# Patient Record
Sex: FEMALE | Race: WHITE | Employment: FULL TIME | URBAN - METROPOLITAN AREA
[De-identification: names, ages, dates, MRNs, and addresses within clinical notes are randomized per-mention and may not be internally consistent; named-entity substitution may affect disease eponyms.]

---

## 2018-11-22 ENCOUNTER — APPOINTMENT (OUTPATIENT)
Dept: GENERAL RADIOLOGY | Age: 48
End: 2018-11-22
Payer: COMMERCIAL

## 2018-11-22 ENCOUNTER — HOSPITAL ENCOUNTER (EMERGENCY)
Age: 48
Discharge: HOME | End: 2018-11-23
Attending: EMERGENCY MEDICINE
Payer: COMMERCIAL

## 2018-11-22 VITALS
DIASTOLIC BLOOD PRESSURE: 91 MMHG | HEART RATE: 69 BPM | RESPIRATION RATE: 18 BRPM | WEIGHT: 210 LBS | SYSTOLIC BLOOD PRESSURE: 132 MMHG | OXYGEN SATURATION: 99 % | BODY MASS INDEX: 34.99 KG/M2 | TEMPERATURE: 98.6 F | HEIGHT: 65 IN

## 2018-11-22 DIAGNOSIS — S52.591A OTHER CLOSED FRACTURE OF DISTAL END OF RIGHT RADIUS, INITIAL ENCOUNTER: Primary | ICD-10-CM

## 2018-11-22 DIAGNOSIS — S52.551A OTHER CLOSED EXTRA-ARTICULAR FRACTURE OF DISTAL END OF RIGHT RADIUS, INITIAL ENCOUNTER: ICD-10-CM

## 2018-11-22 DIAGNOSIS — M25.571 ACUTE RIGHT ANKLE PAIN: ICD-10-CM

## 2018-11-22 PROCEDURE — 73610 X-RAY EXAM OF ANKLE: CPT

## 2018-11-22 PROCEDURE — 73110 X-RAY EXAM OF WRIST: CPT

## 2018-11-22 PROCEDURE — 99283 EMERGENCY DEPT VISIT LOW MDM: CPT

## 2018-11-22 PROCEDURE — 4500000023 HC ED LEVEL 3 PROCEDURE

## 2018-11-22 ASSESSMENT — PAIN DESCRIPTION - ORIENTATION
ORIENTATION: RIGHT
ORIENTATION_2: RIGHT

## 2018-11-22 ASSESSMENT — ENCOUNTER SYMPTOMS
ALLERGIC/IMMUNOLOGIC NEGATIVE: 1
EYES NEGATIVE: 1
GASTROINTESTINAL NEGATIVE: 1
RESPIRATORY NEGATIVE: 1

## 2018-11-22 ASSESSMENT — PAIN DESCRIPTION - LOCATION
LOCATION: ANKLE
LOCATION_2: WRIST

## 2018-11-22 ASSESSMENT — PAIN DESCRIPTION - INTENSITY: RATING_2: 6

## 2018-11-22 ASSESSMENT — PAIN SCALES - GENERAL: PAINLEVEL_OUTOF10: 2

## 2018-11-23 NOTE — ED PROVIDER NOTES
ED Attending Attestation Note     Date of evaluation: 11/22/2018    This patient was seen by the resident. I have seen and examined the patient, agree with the workup, evaluation, management and diagnosis. The care plan has been discussed. My assessment reveals patient with ankle and wrist pain after fall. Found to have distal radius fracture. Splint and ortho/hand followup.      Elisa Alvarez MD  11/23/18 [id number]

## 2018-11-26 ENCOUNTER — OFFICE VISIT (OUTPATIENT)
Dept: ORTHOPEDIC SURGERY | Age: 48
End: 2018-11-26
Payer: COMMERCIAL

## 2018-11-26 VITALS
HEIGHT: 65 IN | SYSTOLIC BLOOD PRESSURE: 151 MMHG | HEART RATE: 65 BPM | BODY MASS INDEX: 34.99 KG/M2 | WEIGHT: 210 LBS | DIASTOLIC BLOOD PRESSURE: 82 MMHG

## 2018-11-26 DIAGNOSIS — S52.501A CLOSED FRACTURE OF DISTAL END OF RIGHT RADIUS, UNSPECIFIED FRACTURE MORPHOLOGY, INITIAL ENCOUNTER: Primary | ICD-10-CM

## 2018-11-26 DIAGNOSIS — M25.531 RIGHT WRIST PAIN: ICD-10-CM

## 2018-11-26 PROCEDURE — G8427 DOCREV CUR MEDS BY ELIG CLIN: HCPCS | Performed by: ORTHOPAEDIC SURGERY

## 2018-11-26 PROCEDURE — 3017F COLORECTAL CA SCREEN DOC REV: CPT | Performed by: ORTHOPAEDIC SURGERY

## 2018-11-26 PROCEDURE — 29075 APPL CST ELBW FNGR SHORT ARM: CPT | Performed by: ORTHOPAEDIC SURGERY

## 2018-11-26 PROCEDURE — G8484 FLU IMMUNIZE NO ADMIN: HCPCS | Performed by: ORTHOPAEDIC SURGERY

## 2018-11-26 PROCEDURE — G8419 CALC BMI OUT NRM PARAM NOF/U: HCPCS | Performed by: ORTHOPAEDIC SURGERY

## 2018-11-26 PROCEDURE — 99203 OFFICE O/P NEW LOW 30 MIN: CPT | Performed by: ORTHOPAEDIC SURGERY

## 2018-11-26 PROCEDURE — 1036F TOBACCO NON-USER: CPT | Performed by: ORTHOPAEDIC SURGERY

## 2018-11-26 RX ORDER — IBUPROFEN 200 MG
200 TABLET ORAL EVERY 6 HOURS PRN
COMMUNITY

## 2018-11-26 NOTE — PROGRESS NOTES
Chief Complaint    No chief complaint on file. History of Present Illness:  Diane Crook is a 46 y.o. female, left-hand dominant, works as a  presenting with history of right wrist injury  Date of injury: 11/22/2018  Mechanism of injury: The patient fell onto her right hand and wrist while walking dog attempting to jump over a puddle  She was subsequently seen at [facility] emergency department where images were obtained of her wrist demonstrating a distal radius fracture with minimal displacement and comminution  She was placed into a wrist splint and is here today for 1st clinical evaluation  She does report fracture of her right distal radius approximately 27 years ago treated nonoperatively  Pain has been overall improving, she does report some tingling in all of her fingers but no discrete numbness         Medical History:  Patient's medications, allergies, past medical, surgical, social and family histories were reviewed and updated as appropriate. Review of Systems:  Pertinent items are noted in HPI  Review of systems reviewed from Patient History Form dated on 11/26/18 and available in the patient's chart under the Media tab. Vital Signs: There were no vitals taken for this visit. General Exam:   Constitutional: Patient is adequately groomed with no evidence of malnutrition  DTRs: Deep tendon reflexes are intact  Mental Status: The patient is oriented to time, place and person. The patient's mood and affect are appropriate. Lymphatic: The lymphatic examination bilaterally reveals all areas to be without enlargement or induration. Vascular: Examination reveals no swelling or calf tenderness. Peripheral pulses are palpable and 2+. Neurological: The patient has good coordination. There is no weakness or sensory deficit.     Right Wrist/right upper extremity Examination:    Inspection:  Splint removed, there is diffuse ecchymosis and swelling of the right wrist without

## 2018-12-03 ENCOUNTER — OFFICE VISIT (OUTPATIENT)
Dept: ORTHOPEDIC SURGERY | Age: 48
End: 2018-12-03
Payer: COMMERCIAL

## 2018-12-03 DIAGNOSIS — S52.501A CLOSED FRACTURE OF DISTAL END OF RIGHT RADIUS, UNSPECIFIED FRACTURE MORPHOLOGY, INITIAL ENCOUNTER: Primary | ICD-10-CM

## 2018-12-03 PROCEDURE — 99213 OFFICE O/P EST LOW 20 MIN: CPT | Performed by: ORTHOPAEDIC SURGERY

## 2018-12-03 PROCEDURE — 1036F TOBACCO NON-USER: CPT | Performed by: ORTHOPAEDIC SURGERY

## 2018-12-03 PROCEDURE — G8484 FLU IMMUNIZE NO ADMIN: HCPCS | Performed by: ORTHOPAEDIC SURGERY

## 2018-12-03 PROCEDURE — G8417 CALC BMI ABV UP PARAM F/U: HCPCS | Performed by: ORTHOPAEDIC SURGERY

## 2018-12-03 PROCEDURE — G8427 DOCREV CUR MEDS BY ELIG CLIN: HCPCS | Performed by: ORTHOPAEDIC SURGERY

## 2018-12-03 PROCEDURE — 3017F COLORECTAL CA SCREEN DOC REV: CPT | Performed by: ORTHOPAEDIC SURGERY

## 2018-12-10 ENCOUNTER — OFFICE VISIT (OUTPATIENT)
Dept: ORTHOPEDIC SURGERY | Age: 48
End: 2018-12-10
Payer: COMMERCIAL

## 2018-12-10 DIAGNOSIS — S52.501A CLOSED FRACTURE OF DISTAL END OF RIGHT RADIUS, UNSPECIFIED FRACTURE MORPHOLOGY, INITIAL ENCOUNTER: Primary | ICD-10-CM

## 2018-12-10 PROCEDURE — G8417 CALC BMI ABV UP PARAM F/U: HCPCS | Performed by: ORTHOPAEDIC SURGERY

## 2018-12-10 PROCEDURE — 99213 OFFICE O/P EST LOW 20 MIN: CPT | Performed by: ORTHOPAEDIC SURGERY

## 2018-12-10 PROCEDURE — 3017F COLORECTAL CA SCREEN DOC REV: CPT | Performed by: ORTHOPAEDIC SURGERY

## 2018-12-10 PROCEDURE — G8427 DOCREV CUR MEDS BY ELIG CLIN: HCPCS | Performed by: ORTHOPAEDIC SURGERY

## 2018-12-10 PROCEDURE — 1036F TOBACCO NON-USER: CPT | Performed by: ORTHOPAEDIC SURGERY

## 2018-12-10 PROCEDURE — G8484 FLU IMMUNIZE NO ADMIN: HCPCS | Performed by: ORTHOPAEDIC SURGERY

## 2018-12-10 NOTE — PROGRESS NOTES
Assessment: 49-year-old female presenting with history of fall onto right wrist  1. Right distal radius fracture, minimally displaced        Treatment Plan: I had a long and detailed discussion with the patient today regarding her new images with evidence of further displacement in settling of her fracture secondary to the dorsal comminution. Options were discussed in detail including operative and nonoperative intervention with the risks and benefits of each treatment. Risks of nonoperative treatment specifically include further change in alignment and eventual malunion with stiffness or pain resulting. However if she does have continued healing and her fracture remains in the place that it is now than I do think that nonoperative treatment could result in a good overall function. I also did offer her the option for surgical stabilization today and correction of the malalignment. The patient prefers nonoperative treatment and would like to proceed with cast. We will plan at this time to that reevaluate in approximately 3 weeks with repeat imaging. She does understand that if she does have symptomatic malunion in the future that osteotomy and correction could be also considered. We will plan to remove cast at next visit and repeat imaging, pending clinical and radiographic exam will likely begin some gentle wrist range of motion and removable brace at that time           No Follow-up on file. History of Present Illness  Lita Lopez is a 46 y.o. female here today for a follow-up for Her right wrist injury consistent with comminuted distal radius fracture. We have been treating her conservatively with casting and immobilization. Date of injury: 11/22/2018  She is now nearly 3 weeks since her injury.  She is here today for repeat imaging and evaluation  Since her last visit her cast is been feeling appropriate, no evidence of new pain or swelling, no numbness or tingling or other symptoms reported

## 2018-12-14 ENCOUNTER — TELEPHONE (OUTPATIENT)
Dept: ORTHOPEDIC SURGERY | Age: 48
End: 2018-12-14

## 2019-01-03 ENCOUNTER — OFFICE VISIT (OUTPATIENT)
Dept: ORTHOPEDIC SURGERY | Age: 49
End: 2019-01-03
Payer: COMMERCIAL

## 2019-01-03 DIAGNOSIS — S52.501A CLOSED FRACTURE OF DISTAL END OF RIGHT RADIUS, UNSPECIFIED FRACTURE MORPHOLOGY, INITIAL ENCOUNTER: Primary | ICD-10-CM

## 2019-01-03 PROCEDURE — G8417 CALC BMI ABV UP PARAM F/U: HCPCS | Performed by: ORTHOPAEDIC SURGERY

## 2019-01-03 PROCEDURE — G8427 DOCREV CUR MEDS BY ELIG CLIN: HCPCS | Performed by: ORTHOPAEDIC SURGERY

## 2019-01-03 PROCEDURE — 3017F COLORECTAL CA SCREEN DOC REV: CPT | Performed by: ORTHOPAEDIC SURGERY

## 2019-01-03 PROCEDURE — L3908 WHO COCK-UP NONMOLDE PRE OTS: HCPCS | Performed by: ORTHOPAEDIC SURGERY

## 2019-01-03 PROCEDURE — G8484 FLU IMMUNIZE NO ADMIN: HCPCS | Performed by: ORTHOPAEDIC SURGERY

## 2019-01-03 PROCEDURE — 99213 OFFICE O/P EST LOW 20 MIN: CPT | Performed by: ORTHOPAEDIC SURGERY

## 2019-01-03 PROCEDURE — 1036F TOBACCO NON-USER: CPT | Performed by: ORTHOPAEDIC SURGERY

## 2019-01-07 ENCOUNTER — HOSPITAL ENCOUNTER (OUTPATIENT)
Dept: OCCUPATIONAL THERAPY | Age: 49
Setting detail: THERAPIES SERIES
Discharge: HOME | End: 2019-01-07
Payer: COMMERCIAL

## 2019-01-07 PROCEDURE — G8985 CARRY GOAL STATUS: HCPCS | Performed by: OCCUPATIONAL THERAPIST

## 2019-01-07 PROCEDURE — 97022 WHIRLPOOL THERAPY: CPT | Performed by: OCCUPATIONAL THERAPIST

## 2019-01-07 PROCEDURE — 97165 OT EVAL LOW COMPLEX 30 MIN: CPT | Performed by: OCCUPATIONAL THERAPIST

## 2019-01-07 PROCEDURE — G8984 CARRY CURRENT STATUS: HCPCS | Performed by: OCCUPATIONAL THERAPIST

## 2019-01-07 PROCEDURE — 97110 THERAPEUTIC EXERCISES: CPT | Performed by: OCCUPATIONAL THERAPIST

## 2019-01-14 ENCOUNTER — HOSPITAL ENCOUNTER (OUTPATIENT)
Dept: OCCUPATIONAL THERAPY | Age: 49
Setting detail: THERAPIES SERIES
Discharge: HOME | End: 2019-01-14
Payer: COMMERCIAL

## 2019-01-14 PROCEDURE — 97022 WHIRLPOOL THERAPY: CPT | Performed by: OCCUPATIONAL THERAPIST

## 2019-01-14 PROCEDURE — 97530 THERAPEUTIC ACTIVITIES: CPT | Performed by: OCCUPATIONAL THERAPIST

## 2019-01-14 PROCEDURE — 97110 THERAPEUTIC EXERCISES: CPT | Performed by: OCCUPATIONAL THERAPIST

## 2019-01-21 ENCOUNTER — HOSPITAL ENCOUNTER (OUTPATIENT)
Dept: OCCUPATIONAL THERAPY | Age: 49
Setting detail: THERAPIES SERIES
Discharge: HOME | End: 2019-01-21
Payer: COMMERCIAL

## 2019-01-21 PROCEDURE — 97110 THERAPEUTIC EXERCISES: CPT | Performed by: OCCUPATIONAL THERAPIST

## 2019-01-21 PROCEDURE — 97022 WHIRLPOOL THERAPY: CPT | Performed by: OCCUPATIONAL THERAPIST

## 2019-01-21 PROCEDURE — 97530 THERAPEUTIC ACTIVITIES: CPT | Performed by: OCCUPATIONAL THERAPIST

## 2019-01-31 ENCOUNTER — HOSPITAL ENCOUNTER (OUTPATIENT)
Dept: OCCUPATIONAL THERAPY | Age: 49
Setting detail: THERAPIES SERIES
Discharge: HOME | End: 2019-01-31
Payer: COMMERCIAL

## 2019-01-31 ENCOUNTER — OFFICE VISIT (OUTPATIENT)
Dept: ORTHOPEDIC SURGERY | Age: 49
End: 2019-01-31
Payer: COMMERCIAL

## 2019-01-31 DIAGNOSIS — S52.501A CLOSED FRACTURE OF DISTAL END OF RIGHT RADIUS, UNSPECIFIED FRACTURE MORPHOLOGY, INITIAL ENCOUNTER: Primary | ICD-10-CM

## 2019-01-31 PROCEDURE — G8985 CARRY GOAL STATUS: HCPCS | Performed by: OCCUPATIONAL THERAPIST

## 2019-01-31 PROCEDURE — G8984 CARRY CURRENT STATUS: HCPCS | Performed by: OCCUPATIONAL THERAPIST

## 2019-01-31 PROCEDURE — 97110 THERAPEUTIC EXERCISES: CPT | Performed by: OCCUPATIONAL THERAPIST

## 2019-01-31 PROCEDURE — 99213 OFFICE O/P EST LOW 20 MIN: CPT | Performed by: ORTHOPAEDIC SURGERY

## 2019-01-31 PROCEDURE — 97022 WHIRLPOOL THERAPY: CPT | Performed by: OCCUPATIONAL THERAPIST

## 2019-01-31 PROCEDURE — G8986 CARRY D/C STATUS: HCPCS | Performed by: OCCUPATIONAL THERAPIST

## 2019-03-14 ENCOUNTER — OFFICE VISIT (OUTPATIENT)
Dept: ORTHOPEDIC SURGERY | Age: 49
End: 2019-03-14
Payer: COMMERCIAL

## 2019-03-14 DIAGNOSIS — S52.501A CLOSED FRACTURE OF DISTAL END OF RIGHT RADIUS, UNSPECIFIED FRACTURE MORPHOLOGY, INITIAL ENCOUNTER: Primary | ICD-10-CM

## 2019-03-14 PROCEDURE — 99213 OFFICE O/P EST LOW 20 MIN: CPT | Performed by: ORTHOPAEDIC SURGERY

## 2019-06-24 ENCOUNTER — OFFICE VISIT (OUTPATIENT)
Dept: ORTHOPEDIC SURGERY | Age: 49
End: 2019-06-24
Payer: COMMERCIAL

## 2019-06-24 DIAGNOSIS — S52.501A CLOSED FRACTURE OF DISTAL END OF RIGHT RADIUS, UNSPECIFIED FRACTURE MORPHOLOGY, INITIAL ENCOUNTER: Primary | ICD-10-CM

## 2019-06-24 PROCEDURE — 99212 OFFICE O/P EST SF 10 MIN: CPT | Performed by: ORTHOPAEDIC SURGERY

## 2019-06-24 NOTE — PROGRESS NOTES
Assessment: 78-year-old female presenting with history of fall onto right wrist  1. Right distal radius fracture, minimally displaced   2. Right carpal tunnel syndrome    Treatment Plan: The patient has had stable symptoms of carpal tunnel syndrome. We will continue with the use of night splinting. 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 Currently her range of motion has been functional and she only has some occasional ulnar-sided pain. She is pleased with her current function and does not desire any other further surgical intervention at this time. We will continue to monitor her symptoms and she is encouraged to call if she begins to experience any worsening pain or stiffness, or if numbness and tingling and night symptoms become more bothersome. No follow-ups on file. History of Present Illness  Bishop Minerva Metzger is a 46 y.o. female here today for a follow-up for Her right wrist injury consistent with comminuted distal radius fracture. We have been treating her conservatively with casting and immobilization. Date of injury: 11/22/2018  We treated her nonoperatively and she is here today for a longer term follow-up now nearly 7 months status post injury. She has been doing well. She has no new numbness and tingling. She does wear a wrist brace at night which has helped with her symptoms. She notices some occasional stiffness in her wrist but this is not been functionally limiting and only very occasional ulnar-sided wrist pain. No other new complaints today      Review of Systems  Pertinent items are noted in HPI  Review of systems reviewed from Patient History Form dated on 11/26/18 and available in the patient's chart under the Media tab. Vital Signs  There were no vitals filed for this visit. Physical Exam  Constitutional:  Patient is well-nourished and demonstrates normal hygiene.   Mental Status:

## 2020-11-08 ENCOUNTER — APPOINTMENT (OUTPATIENT)
Dept: GENERAL RADIOLOGY | Age: 50
End: 2020-11-08
Payer: COMMERCIAL

## 2020-11-08 ENCOUNTER — HOSPITAL ENCOUNTER (EMERGENCY)
Age: 50
Discharge: HOME | End: 2020-11-08
Payer: COMMERCIAL

## 2020-11-08 VITALS
RESPIRATION RATE: 14 BRPM | BODY MASS INDEX: 30.92 KG/M2 | HEART RATE: 62 BPM | TEMPERATURE: 98.7 F | OXYGEN SATURATION: 100 % | HEIGHT: 67 IN | SYSTOLIC BLOOD PRESSURE: 147 MMHG | DIASTOLIC BLOOD PRESSURE: 82 MMHG | WEIGHT: 197 LBS

## 2020-11-08 PROCEDURE — 73560 X-RAY EXAM OF KNEE 1 OR 2: CPT

## 2020-11-08 PROCEDURE — 99284 EMERGENCY DEPT VISIT MOD MDM: CPT

## 2020-11-08 PROCEDURE — 6370000000 HC RX 637 (ALT 250 FOR IP): Performed by: PHYSICIAN ASSISTANT

## 2020-11-08 RX ORDER — NAPROXEN 500 MG/1
500 TABLET ORAL 2 TIMES DAILY WITH MEALS
Qty: 20 TABLET | Refills: 1 | Status: SHIPPED | OUTPATIENT
Start: 2020-11-08

## 2020-11-08 RX ORDER — LIDOCAINE 4 G/G
1 PATCH TOPICAL DAILY
Status: DISCONTINUED | OUTPATIENT
Start: 2020-11-08 | End: 2020-11-08 | Stop reason: HOSPADM

## 2020-11-08 ASSESSMENT — ENCOUNTER SYMPTOMS
COLOR CHANGE: 0
NAUSEA: 0
VOMITING: 0
BACK PAIN: 0

## 2020-11-08 NOTE — ED PROVIDER NOTES
[address] ENCOUNTER          PHYSICIAN ASSISTANT NOTE       Date of evaluation: 11/8/2020    Chief Complaint     Knee Injury (LEFT KNEE INJURY AT WORK WHILE STEPPING OFF A LADDER)      History of Present Illness     Rodrigo Grayson is a 44 y.o. female presenting to the emergency department for evaluation of injury to her left knee while at work. She works at [employer], was coming down off of a ladder when she lost her balance and fell from 4 rungs off the ground landing on her extended left leg. She heard a crunching sound in her leg and had sudden onset of pain in the lateral aspect of her left knee. She was able to ambulate after the injury occurred, but throughout the day she developed worsening pain and swelling of her leg. No bruising or redness. She does not believe that her knee hyperextended. She has no numbness or tingling radiating into her lower leg or foot, no open wounds, abrasions or lacerations. She has not taken any medication for relief of her pain. Review of Systems     Review of Systems   Constitutional: Negative for chills, diaphoresis, fatigue and fever. Gastrointestinal: Negative for nausea and vomiting. Musculoskeletal: Positive for arthralgias (left knee pain). Negative for back pain, joint swelling and neck pain. Skin: Negative for color change and wound. Neurological: Negative for dizziness and numbness. Past Medical, Surgical, Family, and Social History     She has a past medical history of Asthma. She has no past surgical history on file. Her family history is not on file. She reports that she has never smoked.  She has never used smokeless tobacco.    Medications     Discharge Medication List as of 11/8/2020  5:26 PM      CONTINUE these medications which have NOT CHANGED    Details   ibuprofen (ADVIL) 200 MG tablet Take 200 mg by mouth every 6 hours as needed for PainHistorical Med             Allergies     She is allergic to lactose and shellfish-derived products. Physical Exam     INITIAL VITALS: BP: (!) 147/82, Temp: 98.7 °F (37.1 °C), Pulse: 62, Resp: 14, SpO2: 100 %  Physical Exam  Vitals signs and nursing note reviewed. Constitutional:       Appearance: Normal appearance. HENT:      Head: Normocephalic. Cardiovascular:      Rate and Rhythm: Normal rate and regular rhythm. Pulses: Normal pulses. Heart sounds: Normal heart sounds. Pulmonary:      Effort: Pulmonary effort is normal. No respiratory distress. Musculoskeletal:      Comments: Mild tenderness to palpation over the lateral aspect of the left knee extending into the proximal thigh, range of motion of the knee slightly limited due to pain, extensor mechanism intact, flexion present to 20 to 30 degrees, dorsiflexion plantarflexion normal, 2+ DP pulse bilaterally   No laxity with valgus or varus strain, negative anterior and posterior drawer test   Neurological:      General: No focal deficit present. Mental Status: She is alert. Psychiatric:         Mood and Affect: Mood normal.         Behavior: Behavior normal.       Diagnostic Results     RADIOLOGY:  XR KNEE LEFT (1-2 VIEWS)   Final Result      No acute osseous abnormality. LABS:   No results found for this visit on 11/08/20. ED BEDSIDE ULTRASOUND:  None    RECENT VITALS:  BP: (!) 147/82, Temp: 98.7 °F (37.1 °C), Pulse: 62, Resp: 14, SpO2: 100 %     Procedures   None    ED Course     Nursing Notes, Past Medical Hx,Past Surgical Hx, Social Hx, Allergies, and Family Hx were reviewed.     The patient was given the following medications:  Orders Placed This Encounter   Medications    DISCONTD: lidocaine 4 % external patch 1 patch    naproxen (NAPROSYN) 500 MG tablet     Sig: Take 1 tablet by mouth 2 times daily (with meals)     Dispense:  20 tablet     Refill:  1     CONSULTS:  None    MEDICAL DECISION MAKING / ASSESSMENT / Malick Nam is a 44 y.o. female presenting to the emergency department for evaluation of left knee pain. Symptoms started after falling off a ladder and landing on her extended left knee. She denies any hyperextension injury or history consistent with dislocation. Upon presentation she was neurovascularly intact in bilateral lower extremities, no obvious swelling, ecchymosis or erythema. Range of motion slightly limited due to pain although extensor mechanism intact. No laxity with valgus or varus strain. X-ray completed showing no acute osseous abnormality. She had no bony tenderness, all pain located over the lateral aspect of her distal thigh and lateral knee. Discussed option of CT scan with the patient, we agreed that she could monitor symptoms and follow-up with orthopedics as needed for further investigation of symptoms today. She was given an Ace wrap for support and crutches to use as needed. Encouraged take NSAIDs for pain control. At this time felt that she was appropriate for discharge. She is in agreement with this plan and understands return precautions. She was evaluated independently by [facility]. Clinical Impression     1.  Acute pain of left knee        Disposition     PATIENT REFERRED TO:  Marshall Prasad MD  [address]  [phone]    Schedule an appointment as soon as possible for a visit   follow up with orthopedics      DISCHARGE MEDICATIONS:  Discharge Medication List as of 11/8/2020  5:26 PM      START taking these medications    Details   naproxen (NAPROSYN) 500 MG tablet Take 1 tablet by mouth 2 times daily (with meals), Disp-20 tablet,R-1Print             DISPOSITION Decision To Discharge 11/08/2020 05:16:49 PM     Debbie Armendariz PA-C  11/08/20 1948

## 2020-11-08 NOTE — ED NOTES
Patient prepared for and ready to be discharged. Patient discharged at this time in no acute distress after verbalizing understanding of discharge instructions. Patient left after receiving After Visit Summary instructions.       Vonda Napier RN  11/08/20 [id number]

## 2020-11-30 ENCOUNTER — HOSPITAL ENCOUNTER (OUTPATIENT)
Dept: MRI IMAGING | Age: 50
Discharge: HOME | End: 2020-11-30
Payer: COMMERCIAL

## 2020-11-30 PROCEDURE — 73721 MRI JNT OF LWR EXTRE W/O DYE: CPT
